# Patient Record
Sex: FEMALE | Race: ASIAN | NOT HISPANIC OR LATINO | Employment: OTHER | URBAN - METROPOLITAN AREA
[De-identification: names, ages, dates, MRNs, and addresses within clinical notes are randomized per-mention and may not be internally consistent; named-entity substitution may affect disease eponyms.]

---

## 2022-02-01 LAB
ABO (EXTERNAL): NORMAL
HEPATITIS B SURFACE ANTIGEN (EXTERNAL): NEGATIVE
HIV1+2 AB SERPL QL IA: NONREACTIVE
RH (EXTERNAL): POSITIVE
RUBELLA ANTIBODY IGG (EXTERNAL): NORMAL
TREPONEMA PALLIDUM ANTIBODY (EXTERNAL): NONREACTIVE

## 2022-08-16 ENCOUNTER — TRANSFERRED RECORDS (OUTPATIENT)
Dept: HEALTH INFORMATION MANAGEMENT | Facility: CLINIC | Age: 32
End: 2022-08-16

## 2022-08-16 LAB — GROUP B STREPTOCOCCUS (EXTERNAL): NEGATIVE

## 2022-09-09 ENCOUNTER — HOSPITAL ENCOUNTER (OUTPATIENT)
Facility: CLINIC | Age: 32
Discharge: HOME | End: 2022-09-10
Attending: OBSTETRICS & GYNECOLOGY | Admitting: OBSTETRICS & GYNECOLOGY
Payer: COMMERCIAL

## 2022-09-09 VITALS
HEIGHT: 63 IN | WEIGHT: 143 LBS | SYSTOLIC BLOOD PRESSURE: 120 MMHG | TEMPERATURE: 98.6 F | DIASTOLIC BLOOD PRESSURE: 78 MMHG | RESPIRATION RATE: 16 BRPM | BODY MASS INDEX: 25.34 KG/M2

## 2022-09-09 PROBLEM — Z36.89 ENCOUNTER FOR TRIAGE IN PREGNANT PATIENT: Status: ACTIVE | Noted: 2022-09-09

## 2022-09-09 PROCEDURE — 59025 FETAL NON-STRESS TEST: CPT

## 2022-09-09 PROCEDURE — G0463 HOSPITAL OUTPT CLINIC VISIT: HCPCS | Mod: 25

## 2022-09-09 RX ORDER — ONDANSETRON 2 MG/ML
4 INJECTION INTRAMUSCULAR; INTRAVENOUS EVERY 6 HOURS PRN
Status: DISCONTINUED | OUTPATIENT
Start: 2022-09-09 | End: 2022-09-10 | Stop reason: HOSPADM

## 2022-09-09 RX ORDER — METOCLOPRAMIDE 10 MG/1
10 TABLET ORAL EVERY 6 HOURS PRN
Status: DISCONTINUED | OUTPATIENT
Start: 2022-09-09 | End: 2022-09-10 | Stop reason: HOSPADM

## 2022-09-09 RX ORDER — PROCHLORPERAZINE 25 MG
25 SUPPOSITORY, RECTAL RECTAL EVERY 12 HOURS PRN
Status: DISCONTINUED | OUTPATIENT
Start: 2022-09-09 | End: 2022-09-10 | Stop reason: HOSPADM

## 2022-09-09 RX ORDER — PROCHLORPERAZINE MALEATE 5 MG
10 TABLET ORAL EVERY 6 HOURS PRN
Status: DISCONTINUED | OUTPATIENT
Start: 2022-09-09 | End: 2022-09-10 | Stop reason: HOSPADM

## 2022-09-09 RX ORDER — METOCLOPRAMIDE HYDROCHLORIDE 5 MG/ML
10 INJECTION INTRAMUSCULAR; INTRAVENOUS EVERY 6 HOURS PRN
Status: DISCONTINUED | OUTPATIENT
Start: 2022-09-09 | End: 2022-09-10 | Stop reason: HOSPADM

## 2022-09-09 RX ORDER — ONDANSETRON 4 MG/1
4 TABLET, ORALLY DISINTEGRATING ORAL EVERY 6 HOURS PRN
Status: DISCONTINUED | OUTPATIENT
Start: 2022-09-09 | End: 2022-09-10 | Stop reason: HOSPADM

## 2022-09-09 ASSESSMENT — ACTIVITIES OF DAILY LIVING (ADL): ADLS_ACUITY_SCORE: 18

## 2022-09-10 ENCOUNTER — ANESTHESIA EVENT (OUTPATIENT)
Dept: OBGYN | Facility: CLINIC | Age: 32
End: 2022-09-10
Payer: COMMERCIAL

## 2022-09-10 ENCOUNTER — APPOINTMENT (OUTPATIENT)
Dept: GENERAL RADIOLOGY | Facility: CLINIC | Age: 32
End: 2022-09-10
Attending: SPECIALIST
Payer: COMMERCIAL

## 2022-09-10 ENCOUNTER — ANESTHESIA (OUTPATIENT)
Dept: OBGYN | Facility: CLINIC | Age: 32
End: 2022-09-10
Payer: COMMERCIAL

## 2022-09-10 ENCOUNTER — HOSPITAL ENCOUNTER (INPATIENT)
Facility: CLINIC | Age: 32
LOS: 2 days | Discharge: HOME HEALTH | End: 2022-09-12
Attending: OBSTETRICS & GYNECOLOGY | Admitting: OBSTETRICS & GYNECOLOGY
Payer: COMMERCIAL

## 2022-09-10 PROBLEM — Z3A.40 40 WEEKS GESTATION OF PREGNANCY: Status: ACTIVE | Noted: 2022-09-10

## 2022-09-10 LAB
ABO/RH(D): NORMAL
ANTIBODY SCREEN: NEGATIVE
SARS-COV-2 RNA RESP QL NAA+PROBE: NEGATIVE
SPECIMEN EXPIRATION DATE: NORMAL
T PALLIDUM AB SER QL: NONREACTIVE

## 2022-09-10 PROCEDURE — 10907ZC DRAINAGE OF AMNIOTIC FLUID, THERAPEUTIC FROM PRODUCTS OF CONCEPTION, VIA NATURAL OR ARTIFICIAL OPENING: ICD-10-PCS | Performed by: SPECIALIST

## 2022-09-10 PROCEDURE — 258N000003 HC RX IP 258 OP 636: Performed by: OBSTETRICS & GYNECOLOGY

## 2022-09-10 PROCEDURE — 250N000009 HC RX 250: Performed by: ANESTHESIOLOGY

## 2022-09-10 PROCEDURE — 999N000063 XR ABDOMEN PORT 1 VIEW

## 2022-09-10 PROCEDURE — 250N000011 HC RX IP 250 OP 636: Performed by: ANESTHESIOLOGY

## 2022-09-10 PROCEDURE — 88307 TISSUE EXAM BY PATHOLOGIST: CPT | Mod: TC | Performed by: SPECIALIST

## 2022-09-10 PROCEDURE — 250N000013 HC RX MED GY IP 250 OP 250 PS 637

## 2022-09-10 PROCEDURE — 120N000012 HC R&B POSTPARTUM

## 2022-09-10 PROCEDURE — 250N000009 HC RX 250: Performed by: OBSTETRICS & GYNECOLOGY

## 2022-09-10 PROCEDURE — G0463 HOSPITAL OUTPT CLINIC VISIT: HCPCS | Mod: 25

## 2022-09-10 PROCEDURE — 86850 RBC ANTIBODY SCREEN: CPT | Performed by: OBSTETRICS & GYNECOLOGY

## 2022-09-10 PROCEDURE — 258N000003 HC RX IP 258 OP 636: Performed by: ANESTHESIOLOGY

## 2022-09-10 PROCEDURE — 86780 TREPONEMA PALLIDUM: CPT | Performed by: OBSTETRICS & GYNECOLOGY

## 2022-09-10 PROCEDURE — U0003 INFECTIOUS AGENT DETECTION BY NUCLEIC ACID (DNA OR RNA); SEVERE ACUTE RESPIRATORY SYNDROME CORONAVIRUS 2 (SARS-COV-2) (CORONAVIRUS DISEASE [COVID-19]), AMPLIFIED PROBE TECHNIQUE, MAKING USE OF HIGH THROUGHPUT TECHNOLOGIES AS DESCRIBED BY CMS-2020-01-R: HCPCS | Performed by: OBSTETRICS & GYNECOLOGY

## 2022-09-10 PROCEDURE — 250N000011 HC RX IP 250 OP 636: Performed by: OBSTETRICS & GYNECOLOGY

## 2022-09-10 PROCEDURE — 250N000011 HC RX IP 250 OP 636: Performed by: SPECIALIST

## 2022-09-10 PROCEDURE — 710N000010 HC RECOVERY PHASE 1, LEVEL 2, PER MIN: Performed by: SPECIALIST

## 2022-09-10 PROCEDURE — 360N000076 HC SURGERY LEVEL 3, PER MIN: Performed by: SPECIALIST

## 2022-09-10 PROCEDURE — 370N000017 HC ANESTHESIA TECHNICAL FEE, PER MIN: Performed by: SPECIALIST

## 2022-09-10 PROCEDURE — 250N000011 HC RX IP 250 OP 636

## 2022-09-10 PROCEDURE — 88307 TISSUE EXAM BY PATHOLOGIST: CPT | Mod: 26 | Performed by: STUDENT IN AN ORGANIZED HEALTH CARE EDUCATION/TRAINING PROGRAM

## 2022-09-10 PROCEDURE — 59025 FETAL NON-STRESS TEST: CPT

## 2022-09-10 PROCEDURE — 272N000001 HC OR GENERAL SUPPLY STERILE: Performed by: SPECIALIST

## 2022-09-10 PROCEDURE — 36415 COLL VENOUS BLD VENIPUNCTURE: CPT | Performed by: OBSTETRICS & GYNECOLOGY

## 2022-09-10 PROCEDURE — 86901 BLOOD TYPING SEROLOGIC RH(D): CPT | Performed by: OBSTETRICS & GYNECOLOGY

## 2022-09-10 RX ORDER — ONDANSETRON 2 MG/ML
4 INJECTION INTRAMUSCULAR; INTRAVENOUS EVERY 6 HOURS PRN
Status: DISCONTINUED | OUTPATIENT
Start: 2022-09-10 | End: 2022-09-10 | Stop reason: HOSPADM

## 2022-09-10 RX ORDER — TRANEXAMIC ACID 10 MG/ML
1 INJECTION, SOLUTION INTRAVENOUS EVERY 30 MIN PRN
Status: DISCONTINUED | OUTPATIENT
Start: 2022-09-10 | End: 2022-09-10 | Stop reason: HOSPADM

## 2022-09-10 RX ORDER — CARBOPROST TROMETHAMINE 250 UG/ML
250 INJECTION, SOLUTION INTRAMUSCULAR
Status: DISCONTINUED | OUTPATIENT
Start: 2022-09-10 | End: 2022-09-12 | Stop reason: HOSPADM

## 2022-09-10 RX ORDER — PROCHLORPERAZINE 25 MG
25 SUPPOSITORY, RECTAL RECTAL EVERY 12 HOURS PRN
Status: DISCONTINUED | OUTPATIENT
Start: 2022-09-10 | End: 2022-09-10 | Stop reason: HOSPADM

## 2022-09-10 RX ORDER — NALOXONE HYDROCHLORIDE 0.4 MG/ML
0.2 INJECTION, SOLUTION INTRAMUSCULAR; INTRAVENOUS; SUBCUTANEOUS
Status: DISCONTINUED | OUTPATIENT
Start: 2022-09-10 | End: 2022-09-10 | Stop reason: HOSPADM

## 2022-09-10 RX ORDER — LIDOCAINE 40 MG/G
CREAM TOPICAL
Status: DISCONTINUED | OUTPATIENT
Start: 2022-09-10 | End: 2022-09-12 | Stop reason: HOSPADM

## 2022-09-10 RX ORDER — MORPHINE SULFATE 1 MG/ML
INJECTION, SOLUTION EPIDURAL; INTRATHECAL; INTRAVENOUS PRN
Status: DISCONTINUED | OUTPATIENT
Start: 2022-09-10 | End: 2022-09-10

## 2022-09-10 RX ORDER — ONDANSETRON 2 MG/ML
4 INJECTION INTRAMUSCULAR; INTRAVENOUS EVERY 6 HOURS PRN
Status: DISCONTINUED | OUTPATIENT
Start: 2022-09-10 | End: 2022-09-10

## 2022-09-10 RX ORDER — BISACODYL 10 MG
10 SUPPOSITORY, RECTAL RECTAL DAILY PRN
Status: DISCONTINUED | OUTPATIENT
Start: 2022-09-12 | End: 2022-09-12 | Stop reason: HOSPADM

## 2022-09-10 RX ORDER — METOCLOPRAMIDE 10 MG/1
10 TABLET ORAL EVERY 6 HOURS PRN
Status: DISCONTINUED | OUTPATIENT
Start: 2022-09-10 | End: 2022-09-10 | Stop reason: HOSPADM

## 2022-09-10 RX ORDER — NALOXONE HYDROCHLORIDE 0.4 MG/ML
0.4 INJECTION, SOLUTION INTRAMUSCULAR; INTRAVENOUS; SUBCUTANEOUS
Status: DISCONTINUED | OUTPATIENT
Start: 2022-09-10 | End: 2022-09-12 | Stop reason: HOSPADM

## 2022-09-10 RX ORDER — DEXTROSE, SODIUM CHLORIDE, SODIUM LACTATE, POTASSIUM CHLORIDE, AND CALCIUM CHLORIDE 5; .6; .31; .03; .02 G/100ML; G/100ML; G/100ML; G/100ML; G/100ML
INJECTION, SOLUTION INTRAVENOUS CONTINUOUS
Status: DISCONTINUED | OUTPATIENT
Start: 2022-09-10 | End: 2022-09-12 | Stop reason: HOSPADM

## 2022-09-10 RX ORDER — NALOXONE HYDROCHLORIDE 0.4 MG/ML
0.4 INJECTION, SOLUTION INTRAMUSCULAR; INTRAVENOUS; SUBCUTANEOUS
Status: DISCONTINUED | OUTPATIENT
Start: 2022-09-10 | End: 2022-09-10 | Stop reason: HOSPADM

## 2022-09-10 RX ORDER — MISOPROSTOL 200 UG/1
800 TABLET ORAL
Status: DISCONTINUED | OUTPATIENT
Start: 2022-09-10 | End: 2022-09-10 | Stop reason: HOSPADM

## 2022-09-10 RX ORDER — AMOXICILLIN 250 MG
1 CAPSULE ORAL 2 TIMES DAILY
Status: DISCONTINUED | OUTPATIENT
Start: 2022-09-10 | End: 2022-09-12 | Stop reason: HOSPADM

## 2022-09-10 RX ORDER — PROCHLORPERAZINE MALEATE 5 MG
10 TABLET ORAL EVERY 6 HOURS PRN
Status: DISCONTINUED | OUTPATIENT
Start: 2022-09-10 | End: 2022-09-10 | Stop reason: HOSPADM

## 2022-09-10 RX ORDER — ACETAMINOPHEN 325 MG/1
TABLET ORAL
Status: COMPLETED
Start: 2022-09-10 | End: 2022-09-10

## 2022-09-10 RX ORDER — ONDANSETRON 4 MG/1
4 TABLET, ORALLY DISINTEGRATING ORAL EVERY 6 HOURS PRN
Status: DISCONTINUED | OUTPATIENT
Start: 2022-09-10 | End: 2022-09-10 | Stop reason: HOSPADM

## 2022-09-10 RX ORDER — LIDOCAINE 40 MG/G
CREAM TOPICAL
Status: DISCONTINUED | OUTPATIENT
Start: 2022-09-10 | End: 2022-09-10

## 2022-09-10 RX ORDER — HYDROCORTISONE 25 MG/G
CREAM TOPICAL 3 TIMES DAILY PRN
Status: DISCONTINUED | OUTPATIENT
Start: 2022-09-10 | End: 2022-09-12 | Stop reason: HOSPADM

## 2022-09-10 RX ORDER — ACETAMINOPHEN 325 MG/1
650 TABLET ORAL EVERY 4 HOURS PRN
Status: DISCONTINUED | OUTPATIENT
Start: 2022-09-13 | End: 2022-09-12 | Stop reason: HOSPADM

## 2022-09-10 RX ORDER — METHYLERGONOVINE MALEATE 0.2 MG/ML
200 INJECTION INTRAVENOUS
Status: DISCONTINUED | OUTPATIENT
Start: 2022-09-10 | End: 2022-09-12 | Stop reason: HOSPADM

## 2022-09-10 RX ORDER — METOCLOPRAMIDE HYDROCHLORIDE 5 MG/ML
10 INJECTION INTRAMUSCULAR; INTRAVENOUS EVERY 6 HOURS PRN
Status: DISCONTINUED | OUTPATIENT
Start: 2022-09-10 | End: 2022-09-10 | Stop reason: HOSPADM

## 2022-09-10 RX ORDER — ONDANSETRON 4 MG/1
4 TABLET, ORALLY DISINTEGRATING ORAL EVERY 6 HOURS PRN
Status: DISCONTINUED | OUTPATIENT
Start: 2022-09-10 | End: 2022-09-10

## 2022-09-10 RX ORDER — CEFAZOLIN SODIUM 1 G/3ML
INJECTION, POWDER, FOR SOLUTION INTRAMUSCULAR; INTRAVENOUS PRN
Status: DISCONTINUED | OUTPATIENT
Start: 2022-09-10 | End: 2022-09-10

## 2022-09-10 RX ORDER — OXYTOCIN 10 [USP'U]/ML
10 INJECTION, SOLUTION INTRAMUSCULAR; INTRAVENOUS
Status: DISCONTINUED | OUTPATIENT
Start: 2022-09-10 | End: 2022-09-11

## 2022-09-10 RX ORDER — IBUPROFEN 600 MG/1
600 TABLET, FILM COATED ORAL EVERY 6 HOURS
Status: DISCONTINUED | OUTPATIENT
Start: 2022-09-11 | End: 2022-09-12 | Stop reason: HOSPADM

## 2022-09-10 RX ORDER — LIDOCAINE 40 MG/G
CREAM TOPICAL
Status: DISCONTINUED | OUTPATIENT
Start: 2022-09-10 | End: 2022-09-10 | Stop reason: HOSPADM

## 2022-09-10 RX ORDER — NALBUPHINE HYDROCHLORIDE 10 MG/ML
2.5-5 INJECTION, SOLUTION INTRAMUSCULAR; INTRAVENOUS; SUBCUTANEOUS EVERY 6 HOURS PRN
Status: DISCONTINUED | OUTPATIENT
Start: 2022-09-10 | End: 2022-09-11

## 2022-09-10 RX ORDER — MODIFIED LANOLIN
OINTMENT (GRAM) TOPICAL
Status: DISCONTINUED | OUTPATIENT
Start: 2022-09-10 | End: 2022-09-12 | Stop reason: HOSPADM

## 2022-09-10 RX ORDER — OXYTOCIN/0.9 % SODIUM CHLORIDE 30/500 ML
340 PLASTIC BAG, INJECTION (ML) INTRAVENOUS CONTINUOUS PRN
Status: DISCONTINUED | OUTPATIENT
Start: 2022-09-10 | End: 2022-09-12 | Stop reason: HOSPADM

## 2022-09-10 RX ORDER — ROPIVACAINE HYDROCHLORIDE 2 MG/ML
10 INJECTION, SOLUTION EPIDURAL; INFILTRATION; PERINEURAL ONCE
Status: COMPLETED | OUTPATIENT
Start: 2022-09-10 | End: 2022-09-10

## 2022-09-10 RX ORDER — PROCHLORPERAZINE MALEATE 10 MG
10 TABLET ORAL EVERY 6 HOURS PRN
Status: DISCONTINUED | OUTPATIENT
Start: 2022-09-10 | End: 2022-09-12 | Stop reason: HOSPADM

## 2022-09-10 RX ORDER — METOCLOPRAMIDE 10 MG/1
10 TABLET ORAL EVERY 6 HOURS PRN
Status: DISCONTINUED | OUTPATIENT
Start: 2022-09-10 | End: 2022-09-12 | Stop reason: HOSPADM

## 2022-09-10 RX ORDER — OXYTOCIN/0.9 % SODIUM CHLORIDE 30/500 ML
340 PLASTIC BAG, INJECTION (ML) INTRAVENOUS CONTINUOUS PRN
Status: DISCONTINUED | OUTPATIENT
Start: 2022-09-10 | End: 2022-09-10 | Stop reason: HOSPADM

## 2022-09-10 RX ORDER — ROPIVACAINE HYDROCHLORIDE 2 MG/ML
INJECTION, SOLUTION EPIDURAL; INFILTRATION; PERINEURAL
Status: COMPLETED | OUTPATIENT
Start: 2022-09-10 | End: 2022-09-10

## 2022-09-10 RX ORDER — HYDROMORPHONE HCL IN WATER/PF 6 MG/30 ML
PATIENT CONTROLLED ANALGESIA SYRINGE INTRAVENOUS
Status: DISCONTINUED
Start: 2022-09-10 | End: 2022-09-10 | Stop reason: HOSPADM

## 2022-09-10 RX ORDER — NALOXONE HYDROCHLORIDE 0.4 MG/ML
0.2 INJECTION, SOLUTION INTRAMUSCULAR; INTRAVENOUS; SUBCUTANEOUS
Status: DISCONTINUED | OUTPATIENT
Start: 2022-09-10 | End: 2022-09-12 | Stop reason: HOSPADM

## 2022-09-10 RX ORDER — NALBUPHINE HYDROCHLORIDE 10 MG/ML
2.5-5 INJECTION, SOLUTION INTRAMUSCULAR; INTRAVENOUS; SUBCUTANEOUS EVERY 6 HOURS PRN
Status: DISCONTINUED | OUTPATIENT
Start: 2022-09-10 | End: 2022-09-12 | Stop reason: HOSPADM

## 2022-09-10 RX ORDER — PROCHLORPERAZINE 25 MG
25 SUPPOSITORY, RECTAL RECTAL EVERY 12 HOURS PRN
Status: DISCONTINUED | OUTPATIENT
Start: 2022-09-10 | End: 2022-09-12 | Stop reason: HOSPADM

## 2022-09-10 RX ORDER — KETOROLAC TROMETHAMINE 30 MG/ML
15 INJECTION, SOLUTION INTRAMUSCULAR; INTRAVENOUS EVERY 6 HOURS
Status: COMPLETED | OUTPATIENT
Start: 2022-09-10 | End: 2022-09-11

## 2022-09-10 RX ORDER — AZITHROMYCIN 500 MG/1
INJECTION, POWDER, LYOPHILIZED, FOR SOLUTION INTRAVENOUS
Status: DISCONTINUED
Start: 2022-09-10 | End: 2022-09-10 | Stop reason: HOSPADM

## 2022-09-10 RX ORDER — SIMETHICONE 80 MG
80 TABLET,CHEWABLE ORAL 4 TIMES DAILY PRN
Status: DISCONTINUED | OUTPATIENT
Start: 2022-09-10 | End: 2022-09-12 | Stop reason: HOSPADM

## 2022-09-10 RX ORDER — OXYTOCIN 10 [USP'U]/ML
10 INJECTION, SOLUTION INTRAMUSCULAR; INTRAVENOUS
Status: DISCONTINUED | OUTPATIENT
Start: 2022-09-10 | End: 2022-09-10 | Stop reason: HOSPADM

## 2022-09-10 RX ORDER — AMOXICILLIN 250 MG
2 CAPSULE ORAL 2 TIMES DAILY
Status: DISCONTINUED | OUTPATIENT
Start: 2022-09-10 | End: 2022-09-12 | Stop reason: HOSPADM

## 2022-09-10 RX ORDER — ONDANSETRON 2 MG/ML
4 INJECTION INTRAMUSCULAR; INTRAVENOUS EVERY 6 HOURS PRN
Status: DISCONTINUED | OUTPATIENT
Start: 2022-09-10 | End: 2022-09-12 | Stop reason: HOSPADM

## 2022-09-10 RX ORDER — FENTANYL CITRATE 50 UG/ML
INJECTION, SOLUTION INTRAMUSCULAR; INTRAVENOUS
Status: COMPLETED | OUTPATIENT
Start: 2022-09-10 | End: 2022-09-10

## 2022-09-10 RX ORDER — KETOROLAC TROMETHAMINE 30 MG/ML
INJECTION, SOLUTION INTRAMUSCULAR; INTRAVENOUS
Status: COMPLETED
Start: 2022-09-10 | End: 2022-09-10

## 2022-09-10 RX ORDER — LIDOCAINE HYDROCHLORIDE AND EPINEPHRINE 15; 5 MG/ML; UG/ML
3 INJECTION, SOLUTION EPIDURAL
Status: DISCONTINUED | OUTPATIENT
Start: 2022-09-10 | End: 2022-09-10 | Stop reason: HOSPADM

## 2022-09-10 RX ORDER — ACETAMINOPHEN 325 MG/1
975 TABLET ORAL EVERY 6 HOURS
Status: DISCONTINUED | OUTPATIENT
Start: 2022-09-10 | End: 2022-09-12 | Stop reason: HOSPADM

## 2022-09-10 RX ORDER — TRANEXAMIC ACID 10 MG/ML
1 INJECTION, SOLUTION INTRAVENOUS EVERY 30 MIN PRN
Status: DISCONTINUED | OUTPATIENT
Start: 2022-09-10 | End: 2022-09-12 | Stop reason: HOSPADM

## 2022-09-10 RX ORDER — CARBOPROST TROMETHAMINE 250 UG/ML
250 INJECTION, SOLUTION INTRAMUSCULAR
Status: DISCONTINUED | OUTPATIENT
Start: 2022-09-10 | End: 2022-09-10 | Stop reason: HOSPADM

## 2022-09-10 RX ORDER — OXYTOCIN/0.9 % SODIUM CHLORIDE 30/500 ML
100-340 PLASTIC BAG, INJECTION (ML) INTRAVENOUS CONTINUOUS PRN
Status: DISCONTINUED | OUTPATIENT
Start: 2022-09-10 | End: 2022-09-11

## 2022-09-10 RX ORDER — KETOROLAC TROMETHAMINE 30 MG/ML
30 INJECTION, SOLUTION INTRAMUSCULAR; INTRAVENOUS
Status: DISCONTINUED | OUTPATIENT
Start: 2022-09-10 | End: 2022-09-10

## 2022-09-10 RX ORDER — SODIUM CHLORIDE, SODIUM LACTATE, POTASSIUM CHLORIDE, CALCIUM CHLORIDE 600; 310; 30; 20 MG/100ML; MG/100ML; MG/100ML; MG/100ML
INJECTION, SOLUTION INTRAVENOUS CONTINUOUS
Status: DISCONTINUED | OUTPATIENT
Start: 2022-09-10 | End: 2022-09-10 | Stop reason: HOSPADM

## 2022-09-10 RX ORDER — HYDROXYZINE HYDROCHLORIDE 25 MG/1
50 TABLET, FILM COATED ORAL ONCE
Status: COMPLETED | OUTPATIENT
Start: 2022-09-10 | End: 2022-09-10

## 2022-09-10 RX ORDER — FENTANYL CITRATE 50 UG/ML
100 INJECTION, SOLUTION INTRAMUSCULAR; INTRAVENOUS ONCE
Status: COMPLETED | OUTPATIENT
Start: 2022-09-10 | End: 2022-09-10

## 2022-09-10 RX ORDER — METOCLOPRAMIDE HYDROCHLORIDE 5 MG/ML
10 INJECTION INTRAMUSCULAR; INTRAVENOUS EVERY 6 HOURS PRN
Status: DISCONTINUED | OUTPATIENT
Start: 2022-09-10 | End: 2022-09-12 | Stop reason: HOSPADM

## 2022-09-10 RX ORDER — HYDROXYZINE HYDROCHLORIDE 50 MG/1
TABLET, FILM COATED ORAL
Status: COMPLETED
Start: 2022-09-10 | End: 2022-09-10

## 2022-09-10 RX ORDER — EPHEDRINE SULFATE 50 MG/ML
5 INJECTION, SOLUTION INTRAMUSCULAR; INTRAVENOUS; SUBCUTANEOUS
Status: DISCONTINUED | OUTPATIENT
Start: 2022-09-10 | End: 2022-09-10 | Stop reason: HOSPADM

## 2022-09-10 RX ORDER — ONDANSETRON 4 MG/1
4 TABLET, ORALLY DISINTEGRATING ORAL EVERY 6 HOURS PRN
Status: DISCONTINUED | OUTPATIENT
Start: 2022-09-10 | End: 2022-09-12 | Stop reason: HOSPADM

## 2022-09-10 RX ORDER — MISOPROSTOL 200 UG/1
400 TABLET ORAL
Status: DISCONTINUED | OUTPATIENT
Start: 2022-09-10 | End: 2022-09-12 | Stop reason: HOSPADM

## 2022-09-10 RX ORDER — OXYTOCIN 10 [USP'U]/ML
10 INJECTION, SOLUTION INTRAMUSCULAR; INTRAVENOUS
Status: DISCONTINUED | OUTPATIENT
Start: 2022-09-10 | End: 2022-09-12 | Stop reason: HOSPADM

## 2022-09-10 RX ORDER — ONDANSETRON 2 MG/ML
INJECTION INTRAMUSCULAR; INTRAVENOUS PRN
Status: DISCONTINUED | OUTPATIENT
Start: 2022-09-10 | End: 2022-09-10

## 2022-09-10 RX ORDER — SODIUM CHLORIDE, SODIUM LACTATE, POTASSIUM CHLORIDE, CALCIUM CHLORIDE 600; 310; 30; 20 MG/100ML; MG/100ML; MG/100ML; MG/100ML
INJECTION, SOLUTION INTRAVENOUS CONTINUOUS PRN
Status: DISCONTINUED | OUTPATIENT
Start: 2022-09-10 | End: 2022-09-10 | Stop reason: HOSPADM

## 2022-09-10 RX ORDER — MISOPROSTOL 200 UG/1
800 TABLET ORAL
Status: DISCONTINUED | OUTPATIENT
Start: 2022-09-10 | End: 2022-09-12 | Stop reason: HOSPADM

## 2022-09-10 RX ORDER — MISOPROSTOL 200 UG/1
400 TABLET ORAL
Status: DISCONTINUED | OUTPATIENT
Start: 2022-09-10 | End: 2022-09-10 | Stop reason: HOSPADM

## 2022-09-10 RX ORDER — HYDROMORPHONE HCL IN WATER/PF 6 MG/30 ML
.3-.5 PATIENT CONTROLLED ANALGESIA SYRINGE INTRAVENOUS EVERY 30 MIN PRN
Status: DISCONTINUED | OUTPATIENT
Start: 2022-09-10 | End: 2022-09-12 | Stop reason: HOSPADM

## 2022-09-10 RX ORDER — OXYCODONE HYDROCHLORIDE 5 MG/1
5 TABLET ORAL EVERY 4 HOURS PRN
Status: DISCONTINUED | OUTPATIENT
Start: 2022-09-10 | End: 2022-09-12 | Stop reason: HOSPADM

## 2022-09-10 RX ORDER — CITRIC ACID/SODIUM CITRATE 334-500MG
30 SOLUTION, ORAL ORAL
Status: DISCONTINUED | OUTPATIENT
Start: 2022-09-10 | End: 2022-09-10 | Stop reason: HOSPADM

## 2022-09-10 RX ORDER — METHYLERGONOVINE MALEATE 0.2 MG/ML
200 INJECTION INTRAVENOUS
Status: DISCONTINUED | OUTPATIENT
Start: 2022-09-10 | End: 2022-09-10 | Stop reason: HOSPADM

## 2022-09-10 RX ORDER — IBUPROFEN 400 MG/1
800 TABLET, FILM COATED ORAL
Status: DISCONTINUED | OUTPATIENT
Start: 2022-09-10 | End: 2022-09-10

## 2022-09-10 RX ORDER — LIDOCAINE HYDROCHLORIDE 20 MG/ML
INJECTION, SOLUTION EPIDURAL; INFILTRATION; INTRACAUDAL; PERINEURAL PRN
Status: DISCONTINUED | OUTPATIENT
Start: 2022-09-10 | End: 2022-09-10

## 2022-09-10 RX ORDER — OXYTOCIN/0.9 % SODIUM CHLORIDE 30/500 ML
1-24 PLASTIC BAG, INJECTION (ML) INTRAVENOUS CONTINUOUS
Status: DISCONTINUED | OUTPATIENT
Start: 2022-09-10 | End: 2022-09-10 | Stop reason: HOSPADM

## 2022-09-10 RX ADMIN — Medication 2 MILLI-UNITS/MIN: at 07:04

## 2022-09-10 RX ADMIN — MORPHINE SULFATE 1.5 MG: 1 INJECTION, SOLUTION EPIDURAL; INTRATHECAL; INTRAVENOUS at 18:20

## 2022-09-10 RX ADMIN — LIDOCAINE HYDROCHLORIDE 20 ML: 20 INJECTION, SOLUTION EPIDURAL; INFILTRATION; INTRACAUDAL; PERINEURAL at 18:06

## 2022-09-10 RX ADMIN — FENTANYL CITRATE 100 MCG: 50 INJECTION, SOLUTION INTRAMUSCULAR; INTRAVENOUS at 04:47

## 2022-09-10 RX ADMIN — SODIUM CHLORIDE, POTASSIUM CHLORIDE, SODIUM LACTATE AND CALCIUM CHLORIDE: 600; 310; 30; 20 INJECTION, SOLUTION INTRAVENOUS at 11:05

## 2022-09-10 RX ADMIN — Medication: at 17:54

## 2022-09-10 RX ADMIN — KETOROLAC TROMETHAMINE 15 MG: 30 INJECTION, SOLUTION INTRAMUSCULAR; INTRAVENOUS at 19:38

## 2022-09-10 RX ADMIN — CEFAZOLIN 2 G: 1 INJECTION, POWDER, FOR SOLUTION INTRAMUSCULAR; INTRAVENOUS at 18:09

## 2022-09-10 RX ADMIN — Medication 5 MG: at 08:36

## 2022-09-10 RX ADMIN — ACETAMINOPHEN 975 MG: 325 TABLET, FILM COATED ORAL at 20:33

## 2022-09-10 RX ADMIN — Medication 5 MG: at 04:55

## 2022-09-10 RX ADMIN — PHENYLEPHRINE HYDROCHLORIDE 100 MCG: 10 INJECTION INTRAVENOUS at 18:10

## 2022-09-10 RX ADMIN — METHYLERGONOVINE MALEATE 200 MCG: 0.2 INJECTION INTRAVENOUS at 18:14

## 2022-09-10 RX ADMIN — PHENYLEPHRINE HYDROCHLORIDE 0.3 MCG/KG/MIN: 10 INJECTION INTRAVENOUS at 18:09

## 2022-09-10 RX ADMIN — HYDROMORPHONE HYDROCHLORIDE 0.3 MG: 0.2 INJECTION, SOLUTION INTRAMUSCULAR; INTRAVENOUS; SUBCUTANEOUS at 20:42

## 2022-09-10 RX ADMIN — SODIUM CHLORIDE, SODIUM LACTATE, POTASSIUM CHLORIDE, CALCIUM CHLORIDE AND DEXTROSE MONOHYDRATE: 5; 600; 310; 30; 20 INJECTION, SOLUTION INTRAVENOUS at 22:23

## 2022-09-10 RX ADMIN — SODIUM CHLORIDE, POTASSIUM CHLORIDE, SODIUM LACTATE AND CALCIUM CHLORIDE: 600; 310; 30; 20 INJECTION, SOLUTION INTRAVENOUS at 04:59

## 2022-09-10 RX ADMIN — Medication 5 MG: at 06:00

## 2022-09-10 RX ADMIN — HYDROMORPHONE HYDROCHLORIDE 0.4 MG: 0.2 INJECTION, SOLUTION INTRAMUSCULAR; INTRAVENOUS; SUBCUTANEOUS at 23:17

## 2022-09-10 RX ADMIN — Medication: at 04:49

## 2022-09-10 RX ADMIN — HYDROMORPHONE HYDROCHLORIDE 0.4 MG: 0.2 INJECTION, SOLUTION INTRAMUSCULAR; INTRAVENOUS; SUBCUTANEOUS at 22:25

## 2022-09-10 RX ADMIN — ROPIVACAINE HYDROCHLORIDE 10 ML: 2 INJECTION, SOLUTION EPIDURAL; INFILTRATION at 04:47

## 2022-09-10 RX ADMIN — PHENYLEPHRINE HYDROCHLORIDE 100 MCG: 10 INJECTION INTRAVENOUS at 18:18

## 2022-09-10 RX ADMIN — SODIUM CHLORIDE, POTASSIUM CHLORIDE, SODIUM LACTATE AND CALCIUM CHLORIDE: 600; 310; 30; 20 INJECTION, SOLUTION INTRAVENOUS at 18:45

## 2022-09-10 RX ADMIN — ACETAMINOPHEN 975 MG: 325 TABLET ORAL at 20:33

## 2022-09-10 RX ADMIN — PHENYLEPHRINE HYDROCHLORIDE 100 MCG: 10 INJECTION INTRAVENOUS at 18:09

## 2022-09-10 RX ADMIN — HYDROXYZINE HYDROCHLORIDE 50 MG: 25 TABLET, FILM COATED ORAL at 01:04

## 2022-09-10 RX ADMIN — HYDROXYZINE HYDROCHLORIDE 50 MG: 50 TABLET, FILM COATED ORAL at 01:04

## 2022-09-10 RX ADMIN — Medication 340 ML/HR: at 18:12

## 2022-09-10 RX ADMIN — AZITHROMYCIN MONOHYDRATE 500 MG: 500 INJECTION, POWDER, LYOPHILIZED, FOR SOLUTION INTRAVENOUS at 18:17

## 2022-09-10 RX ADMIN — ROPIVACAINE HYDROCHLORIDE 8 ML: 2 INJECTION, SOLUTION EPIDURAL; INFILTRATION at 04:47

## 2022-09-10 RX ADMIN — Medication: at 12:25

## 2022-09-10 RX ADMIN — ONDANSETRON 4 MG: 2 INJECTION INTRAMUSCULAR; INTRAVENOUS at 18:08

## 2022-09-10 RX ADMIN — SODIUM CHLORIDE, POTASSIUM CHLORIDE, SODIUM LACTATE AND CALCIUM CHLORIDE 1000 ML: 600; 310; 30; 20 INJECTION, SOLUTION INTRAVENOUS at 04:18

## 2022-09-10 ASSESSMENT — ACTIVITIES OF DAILY LIVING (ADL)
ADLS_ACUITY_SCORE: 18

## 2022-09-10 NOTE — PLAN OF CARE
After ambulating, patient and sig other return to room. Monitors reapplied. DIANELYS Mckoy RN performs SVE with difficulty due to patient tolerance. Bedpan placed upside down below buttocks which helped. Patient coached through exam and responded well to coaching. Dr Charles given update regarding SVE. She gives order to discharge patient after giving her Vistaril 50mg PO. Patient and spouse educated about labor precautions and prodromal labor. They agree with plan of care.

## 2022-09-10 NOTE — PROGRESS NOTES
OB In House MD    Called to OR to assist for primary c/s indicated for prolapsed cord. I was present for the procedure starting with skin incision through closure of fascia layer. See operative note for details.     Heaven Rodriguez MD, MD on 9/10/2022 at 6:58 PM

## 2022-09-10 NOTE — PLAN OF CARE
VINH Cisneros RN to bedside to perform SVE. Unable to determine dilation due to patient intolerance and posterior position of cervix with low fetal station.

## 2022-09-10 NOTE — ANESTHESIA PREPROCEDURE EVALUATION
Anesthesia Pre-Procedure Evaluation    Patient: Kim Doss   MRN: [id number] : 1990        Procedure :           History reviewed. No pertinent past medical history.   History reviewed. No pertinent surgical history.   No Known Allergies   Social History     Tobacco Use     Smoking status: Never Smoker     Smokeless tobacco: Never Used   Substance Use Topics     Alcohol use: Not Currently      Wt Readings from Last 1 Encounters:   09/10/22 64.9 kg (143 lb)        Anesthesia Evaluation            ROS/MED HX  ENT/Pulmonary:    (-) asthma   Neurologic:  - neg neurologic ROS     Cardiovascular:    (-) PIH   METS/Exercise Tolerance:     Hematologic:     (+) no anticoagulation therapy, no coagulopathy,     Musculoskeletal:       GI/Hepatic:     (+) GERD,     Renal/Genitourinary:       Endo:       Psychiatric/Substance Use:       Infectious Disease:       Malignancy:       Other:            Physical Exam    Airway        Mallampati: II   TM distance: > 3 FB   Neck ROM: full     Respiratory Devices and Support         Dental  no notable dental history         Cardiovascular   cardiovascular exam normal          Pulmonary   pulmonary exam normal                OUTSIDE LABS:  CBC: No results found for: WBC, HGB, HCT, PLT  BMP: No results found for: NA, POTASSIUM, CHLORIDE, CO2, BUN, CR, GLC  COAGS: No results found for: PTT, INR, FIBR  POC: No results found for: BGM, HCG, HCGS  HEPATIC: No results found for: ALBUMIN, PROTTOTAL, ALT, AST, GGT, ALKPHOS, BILITOTAL, BILIDIRECT, FORTUNATO  OTHER: No results found for: PH, LACT, A1C, MARTHA, PHOS, MAG, LIPASE, AMYLASE, TSH, T4, T3, CRP, SED    Anesthesia Plan    ASA Status:  2      Anesthesia Type: Epidural.              Consents    Anesthesia Plan(s) and associated risks, benefits, and realistic alternatives discussed. Questions answered and patient/representative(s) expressed understanding.    - Discussed:     - Discussed with:  Patient         Postoperative Care             Comments:    Other Comments: To OR for C/S stat            Benjamin Saleh DO, DO

## 2022-09-10 NOTE — PLAN OF CARE
Patient and spouse given options of ambulating through halls with cervix re check, or to go home after taking vistaril PO and attempt to rest. After discussing and asking questions, patient decides to ambulate through the halls with cervical exam after. RN orients patient and spouse to unit hallways, and states to nurses station if she decides to stop walking sooner than an hour time. Patient and spouse agree to plan. Plan to recheck at 0030.

## 2022-09-10 NOTE — DISCHARGE INSTRUCTIONS
Discharge Instruction for Undelivered Patients      You were seen for: Labor Assessment  We Consulted: Dr Charles  You had (Test or Medicine):fetal and uterine monitoring, as well as cervical exams     Diet:   Drink 8 to 12 glasses of liquids (milk, juice, water) every day.  You may eat meals and snacks.     Activity:  Count fetal kicks everyday (see handout)  Call your doctor or nurse midwife if your baby is moving less than usual.     Call your provider if you notice:  Swelling in your face or increased swelling in your hands or legs.  Headaches that are not relieved by Tylenol (acetaminophen).  Changes in your vision (blurring: seeing spots or stars.)  Nausea (sick to your stomach) and vomiting (throwing up).   Weight gain of 5 pounds or more per week.  Heartburn that doesn't go away.  Signs of bladder infection: pain when you urinate (use the toilet), need to go more often and more urgently.  The bag of salazar (rupture of membranes) breaks, or you notice leaking in your underwear.  Bright red blood in your underwear.  Abdominal (lower belly) or stomach pain.  For first baby: Contractions (tightening) less than 5 minutes apart for one hour or more that are getting stronger.  Increase or change in vaginal discharge (note the color and amount)      Follow-up:  As scheduled in the clinic

## 2022-09-10 NOTE — PROVIDER NOTIFICATION
09/09/22 6799   Provider Notification   Provider Name/Title Dr Charles   Method of Notification Electronic Page   Request Evaluate - Remote   Notification Reason Patient Arrived;Status Update;SVE   Provider updated that x2 RNs unable to determine cervical dilation due to poor patient tolerance. Discussed posterior position of cervix behind low fetal station of head. FHR tracing and UC tracing discussed with mild contractions palpated. Per provider RN to give patient two options: to ambulate through halls for 1 hour and recheck cervix, or to take 50mg vistaril PO and go home to attempt to rest.

## 2022-09-10 NOTE — PLAN OF CARE
Discharge paperwork discussed with patient and spouse. Pt takes vistaril PO and states she will not be driving home after taking. Her spouse will drive her home. Discharged ambulatory.

## 2022-09-10 NOTE — PLAN OF CARE
Dr. Charles paged and updated on SVE 1/40/0, patient comfortable with epidural, having mild contractions q3-5 minutes. Received orders to start pitocin in an hour if contractions are not more regular.

## 2022-09-10 NOTE — PLAN OF CARE
Pt arrives to MAC with spouse, ambulatory, for labor rule out. She states she has been jessy consistently every 5 minutes since 1900 today. Prior to that the contractions were spaced further apart and not as intense. Pt denies LOF/VB and reports +fetal movement. After obtaining verbal consent, monitors applied. VSS. Intake and triage navigator completed. SVE performed, patient did not tolerate exam well, and RN was unable to pull cervix forward from posterior position due to fetal head being low. RN to allow patient to rest and second RN to follow with another exam, patient agrees to plan.

## 2022-09-10 NOTE — PLAN OF CARE
Patient comfortable with epidural.  Questions asked, answers given.  Dr. Acosta called for update.  Patient and her  in agreement with POC.  Will continue to increase Pitocin PRN to establish regular contractions.      Hypotension noted, pt asymptomatic, LR bolus given, BP increased.  Will continue to monitor.

## 2022-09-10 NOTE — PROGRESS NOTES
"L&D  PN    Comfortable with epidural.  Starting pushing like 45 minutes ago    /81   Temp 98  F (36.7  C) (Temporal)   Resp 16   Ht 1.6 m (5' 3\")   Wt 64.9 kg (143 lb)   SpO2 97%   BMI 25.33 kg/m      abd - gravid soft NT  FHT's 155 with mod variability + accels + early decels - had one prolonged decel while pushing  [place] q 2 min  cx C/C/+3, but feels LOP    A/P:  33 yo G1  @ 40 +1 weeks, has been pushing now for 45 minutes with slow progress.     1.  Continue pushing efforts    Electronically signed by   MD Zaid Vitale Ob/Gyn  [phone]  9/11/22  5:16 PM    "

## 2022-09-10 NOTE — PROGRESS NOTES
"L&D  PN    Very comfortable with the epidural    /58   Temp 98.8  F (37.1  C) (Temporal)   Resp 16   Ht 1.6 m (5' 3\")   Wt 64.9 kg (143 lb)   SpO2 97%   BMI 25.33 kg/m      Gen A&O x3 NAD  ABd gravid soft NT  FHT's 150's with mod variability + accels   Cat 1 tracing  toco q 2-3 min  cx 6//-1  AROM - clear fluid with some old blood  Vertex    A/P:  31 yo G1  @ 40 +1 weeks now in labor  Pitocin at 8mu/min.  Now s/p AROM    1.  Continue pitocin  2.  Will check cx again in 2 hours  3.  Anticipate     Electronically signed by   MD Zaid Vitale Ob/Gyn  [phone]  22  1:30 PM        "

## 2022-09-10 NOTE — H&P
Labor and Delivery History and Physical    HPI: Kim is a 31 y/o G1 @ 40+1 by sure criteria who presents in early labor.  She came to triage last evening with contractions that she reported had progressed to more regular over the day.  Cervical exam was difficult and she tried walking but she was noted to be dilated to a FT only thick and posterior.  She was discharged home with Vistaril but then she called again with significant discomfort.  She was unable to tolerate repeat exam.  She was not tolerating contractions well.  She was therefore admitted for early labor.  She received an epidural and was found to be 1/40/0/soft/mid.    Primary OB:  Dr. Rogers    Antepartum course notable for:  GBS negative   Low risk cell free DNA  Sickle cell trait - FOB negative  S/p flu, tdap and covid-19 vaccines    PMH:  History reviewed. No pertinent past medical history.    PSH:  History reviewed. No pertinent surgical history.    OBH:  G1 - Current pregnancy    SocH:   [place], denies habits x 3    Medications:  PNV    Allergies:  NKDA    ROS: All other systems reviewed and otherwise negative except as in HPI    O:  Vitals:    09/10/22 0517 09/10/22 0520 09/10/22 0600 09/10/22 0700   BP: 102/59 102/59 91/54 96/54   BP Location:       Patient Position:       Cuff Size:       Resp:       Temp:       TempSrc:       SpO2:  98% 96% 99%   Weight:       Height:         Gen: Resting comfortably after epidural  HEENT: Normocephalic, atraumatic  Resp: No increased work of breathing  CV: Regular rate  Abd: gravid, soft, nontender  SVE: 1/70/-1/soft/ant  Ext: warm, well-perfused    A/P: 31 y/o G1 @ 40+1 admitted in early labor  1. Labor  -Cervix has changed, I rechecked now, [place] is 9, contractions somewhat spaced and coupling will add Pitocin for augmentation  -GBS negative  -Comfortable with epidural    2. Rh positive/Rubeall immune/S/p recommended vaccinations    3. BCM  -To be discussed at postpartum visit    4.  Dispo  -Inpatient for delivery, reviewed Dr. Rogers is on call today    Electronically signed by:  Phuong Charles  [facility]  [facility]  Pager: [phone]  September 10, 2022

## 2022-09-10 NOTE — PLAN OF CARE
Data: Patient presented to [facility] at 0323.   Reason for maternal/fetal assessment per patient is Rule Out Labor  .  Patient is a . Prenatal record reviewed.      OB History    Para Term  AB Living   1 0 0 0 0 0   SAB IAB Ectopic Multiple Live Births   0 0 0 0 0      # Outcome Date GA Lbr Medardo/2nd Weight Sex Delivery Anes PTL Lv   1 Current            . Medical history: History reviewed. No pertinent past medical history.. Gestational Age 40w1d. VSS. Fetal movement present. Patient denies vaginal discharge, UTI symptoms, GI problems, bloody show, vaginal bleeding, edema, headache, visual disturbances, epigastric or URQ pain, abdominal pain, rupture of membranes. She has returned for second labor assessment today. She is vocalizing through her contractions and is needing coaching to breathe through contractions. Support persons is present.  Action: Verbal consent for EFM. Triage assessment completed. EFM applied for fetal assessment. Uterine assessment shows frequent contractions that palpate mild (see flow record). Fetal assessment: Presumed adequate fetal oxygenation documented (see flow record).  Response: Dr. Charles informed of patient arrival, inability to tolerate cervical exam again, and UC tracing and FHR tracing discussed. Plan per provider is admit to L&D, patient can obtain epidural, and RN to allow patient to rest until her primary doctor takes over in the morning. Patient verbalized agreement with plan. Patient transferred to room 234 via wheelchair. Report given to DIANELYS Saha RN.

## 2022-09-11 LAB — HGB BLD-MCNC: 10.5 G/DL (ref 11.7–15.7)

## 2022-09-11 PROCEDURE — 250N000011 HC RX IP 250 OP 636: Performed by: SPECIALIST

## 2022-09-11 PROCEDURE — 36415 COLL VENOUS BLD VENIPUNCTURE: CPT | Performed by: SPECIALIST

## 2022-09-11 PROCEDURE — 85018 HEMOGLOBIN: CPT | Performed by: SPECIALIST

## 2022-09-11 PROCEDURE — 120N000012 HC R&B POSTPARTUM

## 2022-09-11 PROCEDURE — 250N000013 HC RX MED GY IP 250 OP 250 PS 637: Performed by: SPECIALIST

## 2022-09-11 RX ADMIN — SENNOSIDES AND DOCUSATE SODIUM 1 TABLET: 50; 8.6 TABLET ORAL at 08:56

## 2022-09-11 RX ADMIN — ACETAMINOPHEN 975 MG: 325 TABLET, FILM COATED ORAL at 14:56

## 2022-09-11 RX ADMIN — ACETAMINOPHEN 975 MG: 325 TABLET, FILM COATED ORAL at 02:59

## 2022-09-11 RX ADMIN — KETOROLAC TROMETHAMINE 15 MG: 30 INJECTION, SOLUTION INTRAMUSCULAR; INTRAVENOUS at 08:56

## 2022-09-11 RX ADMIN — SODIUM CHLORIDE, SODIUM LACTATE, POTASSIUM CHLORIDE, CALCIUM CHLORIDE AND DEXTROSE MONOHYDRATE: 5; 600; 310; 30; 20 INJECTION, SOLUTION INTRAVENOUS at 06:18

## 2022-09-11 RX ADMIN — SENNOSIDES AND DOCUSATE SODIUM 1 TABLET: 50; 8.6 TABLET ORAL at 20:44

## 2022-09-11 RX ADMIN — OXYCODONE HYDROCHLORIDE 5 MG: 5 TABLET ORAL at 11:00

## 2022-09-11 RX ADMIN — IBUPROFEN 600 MG: 600 TABLET ORAL at 20:44

## 2022-09-11 RX ADMIN — OXYCODONE HYDROCHLORIDE 5 MG: 5 TABLET ORAL at 14:56

## 2022-09-11 RX ADMIN — OXYCODONE HYDROCHLORIDE 5 MG: 5 TABLET ORAL at 19:03

## 2022-09-11 RX ADMIN — KETOROLAC TROMETHAMINE 15 MG: 30 INJECTION, SOLUTION INTRAMUSCULAR; INTRAVENOUS at 14:56

## 2022-09-11 RX ADMIN — ACETAMINOPHEN 975 MG: 325 TABLET, FILM COATED ORAL at 20:44

## 2022-09-11 RX ADMIN — KETOROLAC TROMETHAMINE 15 MG: 30 INJECTION, SOLUTION INTRAMUSCULAR; INTRAVENOUS at 02:59

## 2022-09-11 RX ADMIN — ACETAMINOPHEN 975 MG: 325 TABLET, FILM COATED ORAL at 08:56

## 2022-09-11 RX ADMIN — HYDROMORPHONE HYDROCHLORIDE 0.4 MG: 0.2 INJECTION, SOLUTION INTRAMUSCULAR; INTRAVENOUS; SUBCUTANEOUS at 01:44

## 2022-09-11 ASSESSMENT — ACTIVITIES OF DAILY LIVING (ADL)
ADLS_ACUITY_SCORE: 19
ADLS_ACUITY_SCORE: 22
ADLS_ACUITY_SCORE: 22
ADLS_ACUITY_SCORE: 19
ADLS_ACUITY_SCORE: 18
ADLS_ACUITY_SCORE: 22
ADLS_ACUITY_SCORE: 18
ADLS_ACUITY_SCORE: 22
ADLS_ACUITY_SCORE: 18
ADLS_ACUITY_SCORE: 18
ADLS_ACUITY_SCORE: 19
ADLS_ACUITY_SCORE: 22

## 2022-09-11 NOTE — PLAN OF CARE
No counts performed in OR due to emergency surgery status.  X-ray performed at end of case, radiologist confirmed no instruments or laps found.

## 2022-09-11 NOTE — PLAN OF CARE
Patient started pushing at 1626.  Dr. Acosta arrived at bedside at 1650, assisted pt with pushing.  FHTs assessed continuously.  Little progress made, Dr. SAM manually attempted to rotate fetal position vaginally, decels noted, frequent maternal position changes made w/ RTB recovery & tachycardia.  Due to decels Pitocin turned off, as ordered by Dr. SAM, LR bolus initiated.  Dr. SAM noted cord prolapse, she continued to hold the fetal head until relief by another RN that continued to hold the fetal head until birth.  Decision for c/s made at 1759.  Pt taken to OR at 1801 via bed.  Verbal consent for  section received from patient and her .

## 2022-09-11 NOTE — PLAN OF CARE
VSS, pumping for baby in NICU at [facility].  Fundus is firm at U/1, scant flow, no clots.  Incision dressing is CDI.  Pain controlled with Duramorph, Toradol, Tylenol and Oxy.  SBA with activity.  Ambulated x 1 today in bedroom.  Sr out at 1100, voided x 1.  SL in place.  Tolerating regular diet, passing flatus.   and friends are at bedside and supportive.  Will continue to monitor and support.

## 2022-09-11 NOTE — PLAN OF CARE
Data: Kim Doss transferred to [facility] via cart at 2120.   Action: Receiving unit notified of transfer: Yes. Patient and family notified of room change. Report given to Tasha at 2320. Belongings sent to receiving unit. Accompanied by Registered Nurse. Oriented patient to surroundings. Call light within reach.   Response: Patient tolerated transfer and is stable.    Fundus firm, bleeding light. Infant transferred to different hospital for cooling. Patient given the option to transfer hospitals to be with baby but declined at this time. See flowsheet's for further details.

## 2022-09-11 NOTE — PLAN OF CARE
Patient and spouse transferred to room 431 accompanied by DIANELYS Mackay RN. Bedside report received at this time. Patient and spouse oriented to room, call light, and plan of care for the night including frequent vitals and fundal checks. Encouraged to call with questions/concerns.

## 2022-09-11 NOTE — L&D DELIVERY NOTE
OB  Delivery Note      Kim Doss MRN# [id number]   Age: 32 year old YOB: 1990       GA: 40w1d  GP:   Labor Complications: Fetal Intolerance  Cord prolapse  EBL:   mL  Delivery QBL:  585 ml's  Delivery Type: , Low Transverse   ROM to Delivery Time: (Delivered) Hours: 4 Minutes: 51     1 Minute 5 Minute 10 Minute   Apgar Totals: 2   5   8     Personel Present: KENTON FISHER      Details    Pre-Op Diagnosis: 1. Intrauterine pregnancy at 40w1d  2. Cord prolapse   Post-Op Diagnosis: 1. same   Indications:   Cord prolapse   Procedure:   , Low Transverse  via   incision   Anesthesia: Epidural      The patient began pushing around 4:30 pm and was making very little progress.  On exam the fetal head was in the LOP position.  We attempted pushing on both sides in an attempt to turn the fetal head.  I also tried manual rotation, but was not successful.  We continued to push and then around 5:30 pm there were some repetitive late decelerations with rebound tachycardia.  We then changed positions to hands an knees to see if the head would turn and there was a prolonged decel that recovered and then there were 3 consecutive variable decels down to the 60's lasting for a minute each. The baseline was still in the 150's and there was moderate variability.  We had her turn back on to her back and when I examined the patient again, there was a cord prolapse.  (Of note upon delivery during c/s the head had rotated to OA)  I called a stat C/S at 5:59 pm    Informed Consent:  While hold the fetal head up off the prolapsed cord I quickly explained the risks, benefits, complications, of  delivery. Risks of  section include, but are not limited to: injury to nearby structures or organs, infection, blood loss and possible need for transfusion, and potential need for more surgery including hysterectomy. The patient and her  verbally consented.    We brought  the patient back to the OR and the fetal heart rate was in the 50's.  We quickly replaced the flores and splashed the abdomen with betadine.     Procedure Details:   Epidural  anesthesia was found to be adequate. Antibiotics were given for infection prophylaxis.  She received both Ancef and azithromycin.   A Pfannenstiel skin incision was made with scalpel. The incision was carried down to the fascia.  The fascia was incised and extended bluntly.  The rectus muscles were  bluntly.  The peritoneum was identified and entered bluntly and stretched.    The bladder blade was inserted, vesicouterine peritoneum was identified (the bladder appeared somewhat swollen), and bladder flap created bluntly. The lower uterine segment was then incised with a  low transverse incision and was extended bluntly.  The bladder blade was removed and the infant head was delivered atraumatically using the usual maneuvers. The remainder of the infant was delivered at 1811, and the cord clamped and cut, and the baby was handed off to the awaiting clinicians.     The placenta was removed via manual massage. The uterus was then exteriorized and cleared of all clots and debris. Initially, there was some uterine atony and so in addition to IV pitocin, IM methergine was given. The hysterotomy was repaired with suture of 0 Monocryl in a running locked fashion. A second imbricating layer of the same suture was placed and good hemostasis observed. The ovaries and tubes appeared normal bilaterally. The uterus, tubes, and ovaries were then returned to the abdomen and pelvis. The uterine incision was reinspected and found to be hemostatic. The peritoneum was closed with 2-0 vicryl suture. The subfascial spaces were inspected and noted to be hemostatic. The fascia was then reapproximated with two running sutures of 0 PDS tied at the midline. The subcutaneous layer was closed with 2-0 plain gut. The skin was closed with 4-0 Monocryl sutures.  Rashidai  strips were placed across the incision.    The patient tolerated the procedure well. Sponge, instrument, and needle counts were correct and the patient was taken to the recovery room in good and stable condition.       Zoltan Doss [[id number]]    Labor Event Times    Labor onset date: 9/10/22 Onset time: 12:00 PM   Dilation complete date: 9/10/22 Complete time:  3:30 PM   Start pushing date/time: 9/10/2022 1626      Labor Events     labor?: No   steroids: None  Labor Type: Augmentation, AROM  Predominate monitoring during 1st stage: continuous electronic fetal monitoring     Antibiotics received during labor?: No     Rupture date/time: 9/10/22 1320   Rupture type: Artificial Rupture of Membranes  Fluid color: Clear  Fluid odor: Normal     Augmentation: Oxytocin  Augmentation date/time: 9/10/22    Indications for augmentation: Ineffective Contraction Pattern  1:1 continuous labor support provided by?: RN       Delivery/Placenta Date and Time    Delivery Date: 9/10/22 Delivery Time:  6:11 PM   Oxytocin given at the time of delivery: after delivery of baby  Delivering clinician: Niki Acosta MD   Other personnel present at delivery:  Provider Role   Heaven Rodriguez MD Assistant Surgeon         Apgars    Living status: Living   1 Minute 5 Minute 10 Minute 15 Minute 20 Minute   Skin color: 0  1  1      Heart rate: 2  2  2      Reflex irritability: 0  1  2      Muscle tone: 0  0  1      Respiratory effort: 0  1  2      Total: 2  5  8      Apgars assigned by: MARISOL RIVERA,APRN,CNP     Cord    Vessels: 3 Vessels    Cord Complications: Prolapsed               Cord Blood Disposition: Lab    Gases Sent?: Yes    Delayed cord clamping?: No    Stem cell collection?: No        Resuscitation    Methods: Suctioning, Oxygen, NCPAP, Bag Mask, Oximetry, Familia Puff   Care at Delivery: Requested by   Output in Delivery Room: Stool     [place] Measurements    Weight: 6 lb 0.7 oz Length: 1'  "7.69\"   Head circumference: 35 cm    Output in delivery room: Stool     Labor Events and Shoulder Dystocia    Fetal Tracing Prior to Delivery: Category 2  Fetal Tracing Comments: mod variability, fetal tachycardia with late decelerations and then deep variable decelerations     Delivery (Maternal) (Provider to Complete) ([id number])       Blood Loss  Mother: Kim Doss #[id number]   Start of Mother's Information    Delivery Blood Loss  09/10/22 1200 - 09/10/22 1924    Total Surgical QBL Blood Loss (mL) Hospital Encounter 585 mL    Total  585 mL         End of Mother's Information  Mother: Kim Doss #[id number]          Delivery - Provider to Complete ([id number])    Delivering clinician: Niki Acosta MD  Attempted Delivery Types (Choose all that apply): Spontaneous Vaginal Delivery  Delivery Type (Choose the 1 that will go to the Birth History): , Low Transverse                    Priority: STAT    Specifics: Primary nulliparius   Indications for Primary: Cord prolapse   Other personnel:  Provider Role   Heaven Rodriguez MD Assistant Surgeon                Placenta    Removal: Expressed  Disposition: Hospital disposal           Anesthesia    Method: Epidural  Cervical dilation at placement: 0-3                Presentation and Position    Presentation: Vertex    Position: Middle Occiput Anterior               Electronically signed by   Dr. Niki Acosta MD   [facility]  [phone]  22  7:39 PM      Niki Acosta MD  "

## 2022-09-11 NOTE — PROGRESS NOTES
"OB/GYN PN  POD #1 from stat Primary LT[facility] for cord prolapse    S;  Pain is moderately well controlled.  She is tolerating a little bit of food.  No flatus yet.  She stood up, but has not walked yet.  She does plan to start pumping.   The update from the NICU at [place] is that the baby is on the head cooling protocol, but stable.  He is on Room air, and vital signs are stable.    I again offered the option of transferring her care to [facility] today to be closer to the baby, but they are declining at this time.     O:  BP 94/55   Pulse 66   Temp 97.5  F (36.4  C) (Oral)   Resp 16   Ht 1.6 m (5' 3\")   Wt 64.9 kg (143 lb)   SpO2 100%   Breastfeeding Unknown   BMI 25.33 kg/m      Intake/Output Summary (Last 24 hours) at 9/11/2022 0727  Last data filed at 9/11/2022 0400  Gross per 24 hour   Intake 2906 ml   Output 3385 ml   Net -479 ml      Gen A&O x3 NAD  Abd fundus is firm NT  Inc;  covered  Ext: no edema  SCD's in place    hgb was not drawn on admission, but was 13.6 at 36 week visit in the office  No results found for: HGB]  Post op hgb is pending.    A/P:  32 year oldyo POD  # 1 from above, doing well.     1.  Continue current care  2. Discussed delivery and recovery expectations.  If she changes her mind about being transferred, we are happy to help facilitate that.   3. Likely discharge Tuesday am 9/13.  She should follow up with me in the office in 1 week.    Electronically signed by   MD Zaid Vitale Ob/Gyn  [phone]  9/12/22  7:27 AM    "

## 2022-09-11 NOTE — ANESTHESIA POSTPROCEDURE EVALUATION
Patient: Kim Doss    Procedure: Procedure(s):   SECTION       Anesthesia Type:  Epidural    Note:  Disposition: Inpatient   Postop Pain Control: Uneventful            Sign Out: Well controlled pain   PONV: No   Neuro/Psych: Uneventful            Sign Out: Acceptable/Baseline neuro status   Airway/Respiratory: Uneventful            Sign Out: Acceptable/Baseline resp. status   CV/Hemodynamics: Uneventful            Sign Out: Acceptable CV status   Other NRE:    DID A NON-ROUTINE EVENT OCCUR? No           Last vitals:  Vitals:    09/10/22 1600 09/10/22 1630 09/10/22 1700   BP: 103/66 118/81 109/71   Resp:      Temp:  36.7  C (98  F)    SpO2: 97% 97%        Electronically Signed By: Arleen Garcia  September 10, 2022  7:21 PM

## 2022-09-11 NOTE — LACTATION NOTE
"Routine Lactation visit with Kim & her family. Baby boy is in the NICU at [facility], per mom doing well. Kim started pumping every 3 hours after birth. Encouraged to continue pumping every 2-3 hours. Reviewed goal to pump at least 8 times in each 24 hours when establishing milk supply.     Discussed hands on pumping. Reviewed Medela symphony settings with Kim and encouraged use of initiate mode. Discussed initiate vs maintenance mode with Medela symphony pump and when to change to maintenance mode. Encouraged Kim to get a hands free bra for use with pumping.  Kim has a pump for home use.  \"Milk Making Reminders\" and \"Pump volume log\" given and reviewed. Encouraged watching \"Graeme Maximizing Milk\" video online. Also encouraged seeing Lactation through [facility] or follow up peds clinic. Lactation outpatient resources reviewed.    Adela Fuller, RN-C, IBCLC, MNN, PHN, BSN    "

## 2022-09-11 NOTE — PLAN OF CARE
Vitals within defined limits. Fundus firm. Lochia scant to light, no clots noted overnight. Dressing to  incision clean, dry, and intact. Patient reports having incisional pain and uterine cramping pain, rates it between 4-8. Using scheduled Tylenol/Toradol, and PRN IV Dilaudid with moderate relief. Declined ice packs and heat packs at this time. Sr draining adequate amount of pink-tinged urine. Tolerating clear liquids and some crackers, will advance diet for the morning. Patient was able to stand by bedside but did feel some lightheadedness with activity. Pumping initiated. Patient has been emotional and tearful regarding  being in NICU. Spouse at bedside and supportive.

## 2022-09-12 VITALS
HEART RATE: 64 BPM | TEMPERATURE: 98.6 F | BODY MASS INDEX: 25.34 KG/M2 | HEIGHT: 63 IN | RESPIRATION RATE: 16 BRPM | WEIGHT: 143 LBS | OXYGEN SATURATION: 99 % | DIASTOLIC BLOOD PRESSURE: 65 MMHG | SYSTOLIC BLOOD PRESSURE: 102 MMHG

## 2022-09-12 PROCEDURE — 250N000013 HC RX MED GY IP 250 OP 250 PS 637: Performed by: SPECIALIST

## 2022-09-12 RX ORDER — ACETAMINOPHEN 325 MG/1
975 TABLET ORAL EVERY 6 HOURS PRN
Qty: 60 TABLET | Refills: 0 | Status: SHIPPED | OUTPATIENT
Start: 2022-09-12

## 2022-09-12 RX ORDER — DOCUSATE SODIUM 100 MG/1
100 CAPSULE, LIQUID FILLED ORAL 2 TIMES DAILY PRN
Qty: 100 CAPSULE | Refills: 0 | Status: SHIPPED | OUTPATIENT
Start: 2022-09-12

## 2022-09-12 RX ORDER — IBUPROFEN 600 MG/1
600 TABLET, FILM COATED ORAL EVERY 6 HOURS PRN
Qty: 60 TABLET | Refills: 0 | Status: SHIPPED | OUTPATIENT
Start: 2022-09-12

## 2022-09-12 RX ORDER — SIMETHICONE 80 MG
80 TABLET,CHEWABLE ORAL EVERY 6 HOURS PRN
Qty: 60 TABLET | Refills: 0 | Status: SHIPPED | OUTPATIENT
Start: 2022-09-12

## 2022-09-12 RX ORDER — OXYCODONE HYDROCHLORIDE 5 MG/1
5 TABLET ORAL EVERY 6 HOURS PRN
Qty: 20 TABLET | Refills: 0 | Status: SHIPPED | OUTPATIENT
Start: 2022-09-12

## 2022-09-12 RX ADMIN — ACETAMINOPHEN 975 MG: 325 TABLET, FILM COATED ORAL at 15:22

## 2022-09-12 RX ADMIN — ACETAMINOPHEN 975 MG: 325 TABLET, FILM COATED ORAL at 03:01

## 2022-09-12 RX ADMIN — IBUPROFEN 600 MG: 600 TABLET ORAL at 15:22

## 2022-09-12 RX ADMIN — IBUPROFEN 600 MG: 600 TABLET ORAL at 09:48

## 2022-09-12 RX ADMIN — OXYCODONE HYDROCHLORIDE 5 MG: 5 TABLET ORAL at 04:21

## 2022-09-12 RX ADMIN — IBUPROFEN 600 MG: 600 TABLET ORAL at 03:01

## 2022-09-12 RX ADMIN — SENNOSIDES AND DOCUSATE SODIUM 1 TABLET: 50; 8.6 TABLET ORAL at 08:19

## 2022-09-12 RX ADMIN — OXYCODONE HYDROCHLORIDE 5 MG: 5 TABLET ORAL at 00:18

## 2022-09-12 RX ADMIN — ACETAMINOPHEN 975 MG: 325 TABLET, FILM COATED ORAL at 09:48

## 2022-09-12 ASSESSMENT — ACTIVITIES OF DAILY LIVING (ADL)
ADLS_ACUITY_SCORE: 18

## 2022-09-12 NOTE — PLAN OF CARE
Whiteboard updated. Discussed with patient and spouse plan of care. Pt denies pain at this time. Educated patient on deep breathing and ambulation, pt verbalized understanding. Discussed with patient and spouse status of , encouraged patient to continue pumping.

## 2022-09-12 NOTE — PLAN OF CARE
VSS.Drsg removed, Incision CDI with adh strips.Incisional pain well controlled with Tylenol,Ibuprofen & Oxycodone., Also using abdominal binder.Aquak pade applied to abdomen for gas pains & Ambulated in plasencia with RN.Tahi reg diet. Asks for SBA when in & out of bed and when ambulating per pt request. Pumping for EBM and getting drops. Enc;d to cont to pump.Refused shower this evening ( I did offer x 2 to help with shower). and friend here to visit. [place] at [facility]; for cooling.

## 2022-09-12 NOTE — PLAN OF CARE
Vital signs stable. Postpartum assessment WDL. Incision steris CDI. Pain controlled with tylenol, ibuprofen and oxycodone. Aqua K pad given for added comfort.  Patient ambulating with SBA- needs encouragement to ambulate.  Pumping every 3 hours during the daytime for  in NICU.  present and supportive. Will continue with current plan of care.      Report given to Ira at 0630.

## 2022-09-12 NOTE — DISCHARGE SUMMARY
[facility] Discharge Summary    Kim Doss MRN# [id number]   Age: 32 year old YOB: 1990     Date of Admission:  9/10/2022  Date of Discharge::  2022  Admitting Physician:  Phuong Charles MD  Discharge Physician:  Heaven Shay MD     Home clinic: [facility]          Admission Diagnoses:   Encounter for triage in pregnant patient [Z36.89]  Indication for care in labor or delivery [O75.9]          Discharge Diagnosis:   Same as above  Prolapsed cord s/p CS          Procedures:   Procedure(s): Primary low transverse  section          Medications Prior to Admission:     Medications Prior to Admission   Medication Sig Dispense Refill Last Dose     Prenatal Vit-Fe Fumarate-FA (PRENATAL PO)    Unknown at Unknown time             Discharge Medications:     Current Discharge Medication List      START taking these medications    Details   acetaminophen (TYLENOL) 325 MG tablet Take 3 tablets (975 mg) by mouth every 6 hours as needed for mild pain  Qty: 60 tablet, Refills: 0    Associated Diagnoses:  delivery delivered      docusate sodium (COLACE) 100 MG capsule Take 1 capsule (100 mg) by mouth 2 times daily as needed for constipation  Qty: 100 capsule, Refills: 0    Associated Diagnoses:  delivery delivered      ibuprofen (ADVIL/MOTRIN) 600 MG tablet Take 1 tablet (600 mg) by mouth every 6 hours as needed for moderate pain  Qty: 60 tablet, Refills: 0    Associated Diagnoses:  delivery delivered      oxyCODONE (ROXICODONE) 5 MG tablet Take 1 tablet (5 mg) by mouth every 6 hours as needed  Qty: 20 tablet, Refills: 0    Associated Diagnoses:  delivery delivered      simethicone (MYLICON) 80 MG chewable tablet Take 1 tablet (80 mg) by mouth every 6 hours as needed for flatulence or other (gas pain)  Qty: 60 tablet, Refills: 0    Associated Diagnoses:  delivery delivered         CONTINUE these medications which have NOT CHANGED     Details   Prenatal Vit-Fe Fumarate-FA (PRENATAL PO)                    Consultations:   Anesthesia          Brief History of Labor or Admission:   See H&P           Hospital Course:   The patient's hospital course was unremarkable except for needing the emergent CS for the prolapsed cord.  Pt's baby did get transferred to [facility] for head cooling but was doing well on day of discharge.  The recovered as anticipated and experienced no post-operative complications. On discharge, her pain was well controlled. Vaginal bleeding is similar to peak menstrual flow.  Voiding without difficulty.  Ambulating well and tolerating a normal diet.  No fever or significant wound drainage.  She was discharged on post-partum day #2.    Post-partum hemoglobin:   Hemoglobin   Date Value Ref Range Status   09/11/2022 10.5 (L) 11.7 - 15.7 g/dL Final             Discharge Instructions and Follow-Up:   Discharge diet: Regular   Discharge activity: Activity as tolerated   Discharge follow-up: Follow up with primary care provider in 14 days   Wound care: Steri-strips off in 10 days           Discharge Disposition:   Discharged to home      Attestation:  I have reviewed today's vital signs, notes, medications, labs and imaging.    Heaven Shay MD     Electronically signed by Heaven Shay MD  Call [phone] to have me paged or find out who's rafael [facility]  [facility]   September 12, 2022  9:12 AM

## 2022-09-12 NOTE — PROGRESS NOTES
POSTOP DAY 2 NOTE s/p primary stat CS for prolapsed cord-baby went to NICU at [place] for cooling    SUBJECTIVE:  pain controlled.  Ambulating and voiding.  Lochia wnl.  Feels a little gaseous distension but passing flatus and tolerating food well.  Baby doing well so far.    OBJECTIVE:  Vitals:    22 0015 22 0421 22 0803   BP:  107/66  102/57   BP Location:  Left arm  Left arm   Patient Position:    Semi-Chaves's   Cuff Size:       Pulse:  68  64   Resp: 16 14 16 16   Temp:  98.6  F (37  C)  98.1  F (36.7  C)   TempSrc:  Oral  Oral   SpO2:       Weight:       Height:         PHYSICAL EXAM:  A&OX3 NAD  S, moderate appropriate tenderness, nd, ff and below umbilicus  INC: c/d/i with ss  Ext: no edema bilat, no calf tenderness    No lab results found.    Invalid input(s): SODIUM, [id number], CALCIUM    Recent Labs   Lab Test 22  0946   HGB 10.5*       Patient Active Problem List    Diagnosis Date Noted     Indication for care in labor or delivery 09/10/2022     Priority: Medium     Cord prolapse 09/10/2022     Priority: Medium      delivery delivered 09/10/2022     Priority: Medium     40 weeks gestation of pregnancy 09/10/2022     Priority: Medium     Encounter for triage in pregnant patient 2022     Priority: Medium     ASSESSMENT & PLAN:   1. PPD# 2.  Doing well overall.  Routine Care.   2. Dispo-discharge home later today.     Electronically signed by Heaven Shay MD  Call [phone] to have me paged or find out who's rafael [facility]  [facility]   2022  8:36 AM

## 2022-09-14 LAB
PATH REPORT.COMMENTS IMP SPEC: NORMAL
PATH REPORT.COMMENTS IMP SPEC: NORMAL
PATH REPORT.FINAL DX SPEC: NORMAL
PATH REPORT.GROSS SPEC: NORMAL
PATH REPORT.MICROSCOPIC SPEC OTHER STN: NORMAL
PATH REPORT.RELEVANT HX SPEC: NORMAL
PHOTO IMAGE: NORMAL

## 2022-09-19 NOTE — PROCEDURES
OB  Delivery Note      Kim Doss MRN# [id number]   Age: 32 year old YOB: 1990       GA: 40w1d  GP:   Labor Complications: Fetal Intolerance  Cord prolapse  EBL:   mL  Delivery QBL:  585 ml's  Delivery Type: , Low Transverse   ROM to Delivery Time: (Delivered) Hours: 4 Minutes: 51     1 Minute 5 Minute 10 Minute   Apgar Totals: 2   5   8     Personel Present: KENTON FISHER      Details    Pre-Op Diagnosis: 1. Intrauterine pregnancy at 40w1d  2. Cord prolapse   Post-Op Diagnosis: 1. same   Indications:   Cord prolapse   Procedure:   , Low Transverse  via   incision   Anesthesia: Epidural      The patient began pushing around 4:30 pm and was making very little progress.  On exam the fetal head was in the LOP position.  We attempted pushing on both sides in an attempt to turn the fetal head.  I also tried manual rotation, but was not successful.  We continued to push and then around 5:30 pm there were some repetitive late decelerations with rebound tachycardia.  We then changed positions to hands an knees to see if the head would turn and there was a prolonged decel that recovered and then there were 3 consecutive variable decels down to the 60's lasting for a minute each. The baseline was still in the 150's and there was moderate variability.  We had her turn back on to her back and when I examined the patient again, there was a cord prolapse.  (Of note upon delivery during c/s the head had rotated to OA)  I called a stat C/S at 5:59 pm    Informed Consent:  While hold the fetal head up off the prolapsed cord I quickly explained the risks, benefits, complications, of  delivery. Risks of  section include, but are not limited to: injury to nearby structures or organs, infection, blood loss and possible need for transfusion, and potential need for more surgery including hysterectomy. The patient and her  verbally consented.    We brought  the patient back to the OR and the fetal heart rate was in the 50's.  We quickly replaced the flores and splashed the abdomen with betadine.     Procedure Details:   Epidural  anesthesia was found to be adequate. Antibiotics were given for infection prophylaxis.  She received both Ancef and azithromycin.   A Pfannenstiel skin incision was made with scalpel. The incision was carried down to the fascia.  The fascia was incised and extended bluntly.  The rectus muscles were  bluntly.  The peritoneum was identified and entered bluntly and stretched.    The bladder blade was inserted, vesicouterine peritoneum was identified (the bladder appeared somewhat swollen), and bladder flap created bluntly. The lower uterine segment was then incised with a  low transverse incision and was extended bluntly.  The bladder blade was removed and the infant head was delivered atraumatically using the usual maneuvers. The remainder of the infant was delivered at 1811, and the cord clamped and cut, and the baby was handed off to the awaiting clinicians.     The placenta was removed via manual massage. The uterus was then exteriorized and cleared of all clots and debris. Initially, there was some uterine atony and so in addition to IV pitocin, IM methergine was given. The hysterotomy was repaired with suture of 0 Monocryl in a running locked fashion. A second imbricating layer of the same suture was placed and good hemostasis observed. The ovaries and tubes appeared normal bilaterally. The uterus, tubes, and ovaries were then returned to the abdomen and pelvis. The uterine incision was reinspected and found to be hemostatic. The peritoneum was closed with 2-0 vicryl suture. The subfascial spaces were inspected and noted to be hemostatic. The fascia was then reapproximated with two running sutures of 0 PDS tied at the midline. The subcutaneous layer was closed with 2-0 plain gut. The skin was closed with 4-0 Monocryl sutures.  Rashidai  strips were placed across the incision.    The patient tolerated the procedure well. Sponge, instrument, and needle counts were correct and the patient was taken to the recovery room in good and stable condition.       Jeison Michele [[id number]]    Labor Event Times    Labor onset date: 9/10/22 Onset time: 12:00 PM   Dilation complete date: 9/10/22 Complete time:  3:30 PM   Start pushing date/time: 9/10/2022 1626      Labor Length    1st Stage (hrs): 3 (min): 30   2nd Stage (hrs): 2 (min): 41      Labor Events     labor?: No   steroids: None  Labor Type: Augmentation, AROM  Predominate monitoring during 1st stage: continuous electronic fetal monitoring     Antibiotics received during labor?: No     Rupture date/time: 9/10/22 1320   Rupture type: Artificial Rupture of Membranes  Fluid color: Clear  Fluid odor: Normal     Augmentation: Oxytocin  Augmentation date/time: 9/10/22    Indications for augmentation: Ineffective Contraction Pattern  1:1 continuous labor support provided by?: RN       Delivery/Placenta Date and Time    Delivery Date: 9/10/22 Delivery Time:  6:11 PM   Oxytocin given at the time of delivery: after delivery of baby  Delivering clinician: Niki Acosta MD   Other personnel present at delivery:  Provider Role   Heaven Rodriguez MD Assistant Surgeon         Apgars    Living status: Living   1 Minute 5 Minute 10 Minute 15 Minute 20 Minute   Skin color: 0  1  1      Heart rate: 2  2  2      Reflex irritability: 0  1  2      Muscle tone: 0  0  1      Respiratory effort: 0  1  2      Total: 2  5  8      Apgars assigned by: MARISOL MECL,APRN,CNP     Cord    Vessels: 3 Vessels    Cord Complications: Prolapsed               Cord Blood Disposition: Lab    Gases Sent?: Yes    Delayed cord clamping?: No    Stem cell collection?: No        Resuscitation    Methods: Suctioning, Oxygen, NCPAP, Bag Mask, Oximetry, Familia Puff  [place] [facility] at Delivery: Requested by Niki  "MD Dave to attend STAT  section due to prolapsed cord. FHT 53 bpm immediately prior to  section.  Infant with decreased tone, cyanosis, and no respiratory effort.   Infant positioned, dried/stimulated without response. T-resuscitator PPV 20/5-6 x 40-60 FiO2 21-50% x 4 minutes. Heart rate 140 bpm. Spontaneous respirations noted at 2.5-3 minutes of age. SaO2 98% at 2 minutes of age. Switched to T-resuscitator CPAP + 6 cm initiated at 4 minutes. FiO2 weaned to room air/SaO2. Infant color, tone, perfusion slowly improving. Cry noted ~5 minutes of age. CPAP discontinued. Infant shown to mother/father prior to transferring to NICU. Exam WNL except decreased tissue perfusion.  Myra Hall, APRN, CNP   Advanced Practice Service  1811 hours at 09/10/2022  Output in Delivery Room: Stool      Measurements    Weight: 6 lb 0.7 oz Length: 1' 7.69\"   Head circumference: 35 cm    Output in delivery room: Stool     Labor Events and Shoulder Dystocia    Fetal Tracing Prior to Delivery: Category 2  Fetal Tracing Comments: mod variability, fetal tachycardia with late decelerations and then deep variable decelerations     Delivery (Maternal) (Provider to Complete) ([id number])       Blood Loss  Mother: Kim Doss #[id number]   Start of Mother's Information    Delivery Blood Loss  09/10/22 1200 - 22 1811    Total Surgical QBL Blood Loss (mL) Hospital Encounter 585 mL    Total  585 mL         End of Mother's Information  Mother: Kim Doss #[id number]          Delivery - Provider to Complete ([id number])    Delivering clinician: Niki Acosta MD  Attempted Delivery Types (Choose all that apply): Spontaneous Vaginal Delivery  Delivery Type (Choose the 1 that will go to the Birth History): , Low Transverse                    Priority: STAT    Specifics: Primary nulliparius   Indications for Primary: Cord prolapse   Other personnel:  Provider Role   Michael" Heaven Shipman MD Assistant Surgeon                Placenta    Removal: Expressed  Disposition: Hospital disposal           Anesthesia    Method: Epidural  Cervical dilation at placement: 0-3                Presentation and Position    Presentation: Vertex    Position: Middle Occiput Anterior               Electronically signed by   Dr. Niki Acosta MD   [facility]  [phone]  9/11/22  7:39 PM      Niki Acosta MD

## (undated) DEVICE — SU VICRYL 2-0 CT-1 27" J339H

## (undated) DEVICE — SOL NACL 0.9% IRRIG 1000ML BOTTLE 07138-09

## (undated) DEVICE — GLOVE PROTEXIS BLUE W/NEU-THERA 6.0  2D73EB60

## (undated) DEVICE — SU PLAIN 2-0 CT 27" 853H

## (undated) DEVICE — SUCTION CANISTER MEDIVAC LINER 3000ML W/LID 65651-530

## (undated) DEVICE — BLADE CLIPPER 4406

## (undated) DEVICE — PACK C-SECTION LF PL15OTA83B

## (undated) DEVICE — PREP CHLORAPREP 26ML TINTED ORANGE  260815

## (undated) DEVICE — CATH TRAY FOLEY 16FR BARDEX W/DRAIN BAG STATLOCK 300316A

## (undated) DEVICE — DRSG TELFA ISLAND 4X8" 7541

## (undated) DEVICE — STPL SKIN PROXIMATE 35 WIDE PMW35

## (undated) DEVICE — GLOVE PROTEXIS BLUE W/NEU-THERA 6.5  2D73EB65

## (undated) DEVICE — SU CHROMIC 2-0 TIE 54" S113H

## (undated) DEVICE — LINEN C-SECTION 5415

## (undated) DEVICE — SU CHROMIC 1 CTX 36" 905H

## (undated) DEVICE — SU PDS II 0 CT 36" Z358T

## (undated) DEVICE — ESU GROUND PAD UNIVERSAL W/O CORD

## (undated) RX ORDER — CHLOROPROCAINE HYDROCHLORIDE 30 MG/ML
INJECTION, SOLUTION EPIDURAL; INFILTRATION; INTRACAUDAL; PERINEURAL
Status: DISPENSED
Start: 2022-09-10

## (undated) RX ORDER — MORPHINE SULFATE 1 MG/ML
INJECTION, SOLUTION EPIDURAL; INTRATHECAL; INTRAVENOUS
Status: DISPENSED
Start: 2022-09-10

## (undated) RX ORDER — PROPOFOL 10 MG/ML
INJECTION, EMULSION INTRAVENOUS
Status: DISPENSED
Start: 2022-09-10

## (undated) RX ORDER — LIDOCAINE HCL/EPINEPHRINE/PF 2%-1:200K
VIAL (ML) INJECTION
Status: DISPENSED
Start: 2022-09-10

## (undated) RX ORDER — CEFAZOLIN SODIUM 1 G/3ML
INJECTION, POWDER, FOR SOLUTION INTRAMUSCULAR; INTRAVENOUS
Status: DISPENSED
Start: 2022-09-10